# Patient Record
Sex: MALE | Race: WHITE | Employment: STUDENT | URBAN - METROPOLITAN AREA
[De-identification: names, ages, dates, MRNs, and addresses within clinical notes are randomized per-mention and may not be internally consistent; named-entity substitution may affect disease eponyms.]

---
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
## 2023-01-08 ENCOUNTER — HEALTH MAINTENANCE LETTER (OUTPATIENT)
Age: 18
End: 2023-01-08

## 2023-01-22 ENCOUNTER — E-VISIT (OUTPATIENT)
Dept: FAMILY MEDICINE | Facility: CLINIC | Age: 18
End: 2023-01-22
Payer: COMMERCIAL

## 2023-01-22 DIAGNOSIS — Z53.9 ERRONEOUS ENCOUNTER--DISREGARD: Primary | ICD-10-CM

## 2023-01-23 ENCOUNTER — DOCUMENTATION ONLY (OUTPATIENT)
Dept: BEHAVIORAL HEALTH | Facility: CLINIC | Age: 18
End: 2023-01-23
Payer: COMMERCIAL

## 2023-01-23 NOTE — PROGRESS NOTES
[facility] [employer] PHQ-9 Follow-up  Behavioral Health Clinician Triage Service    Stream PHQ-9 Responses:  [facility] Follow-up to PHQ 1/5/2017 1/22/2023   PHQ-9 9. Suicide Ideation past 2 weeks Not at all Several days   Thoughts of suicide or self harm in past 2 weeks - No   Thoughts of suicide or self harm in past 2 weeks - No   PHQ-9 Safety concerns? - No   PHQ-9 Safety concerns? - No        1st Outreach Date: January 23, 2023 Time: 10:32 AM  Outcome: No Answer.  [facility] Pool will make one more phone attempt within 24 hours.  [facility] called pt and was unable to leave a VM due to voicemail box not being set up yet. [facility] also did send a Stream  Message on this date as well. [facility] also sent a Stream message to PCP regarding pt and potential safety concerns. [facility] is waiting for a response.   SEAN Newton     2nd Outreach Date: January 23, 2023 Time: 9:50 AM  Outcome: No Answer.  [facility] was again unable to leave a VM due to mailbox not being set up yet.   SEAN Newton    [facility] was able to reach pt over the phone at 11:18 AM. Pt denies any current safety concerns including SI/HI/SIB. No hx of suicide attempts. Pt works with PCP regarding ADHD symptom management. Per chart review, pt had reached out to PCP about scheduling an eVist to discuss being referred to therapy or psychiatry. Per chart review, pt endorses feelings of self-loathing. [facility] asked pt if he would like help with a referral or if he would like [facility] to reach out to PCP in regards to scheduling a follow-up appointment. Pt states that he can reach out to his PCP through Stream. Pt states that at this time, he plans to establish with a therapist through his [employer]. Pt declined needing further assistance at this time.     Disposition:    - Recommendations / Safety Plan: A safety and risk management plan has not been developed at this time, however patient was encouraged to call [facility] should there be a change in any of these risk factors.

## 2023-01-24 NOTE — TELEPHONE ENCOUNTER
[facility] message sent.  Voicemail has not been set up yet.  Keira Coppola, [facility]

## 2023-04-29 ENCOUNTER — OFFICE VISIT (OUTPATIENT)
Dept: ORTHOPEDICS | Facility: CLINIC | Age: 18
End: 2023-04-29
Payer: COMMERCIAL

## 2023-04-29 VITALS
SYSTOLIC BLOOD PRESSURE: 136 MMHG | HEART RATE: 56 BPM | WEIGHT: 245 LBS | BODY MASS INDEX: 33 KG/M2 | DIASTOLIC BLOOD PRESSURE: 73 MMHG

## 2023-04-29 DIAGNOSIS — M25.462 KNEE EFFUSION, LEFT: ICD-10-CM

## 2023-04-29 DIAGNOSIS — S89.92XD KNEE INJURY, LEFT, SUBSEQUENT ENCOUNTER: ICD-10-CM

## 2023-04-29 DIAGNOSIS — M25.562 ACUTE PAIN OF LEFT KNEE: Primary | ICD-10-CM

## 2023-04-29 PROCEDURE — 99204 OFFICE O/P NEW MOD 45 MIN: CPT | Performed by: FAMILY MEDICINE

## 2023-04-29 NOTE — PROGRESS NOTES
Timothy Willis  :  2005  DOS: 2023  MRN: [id number]    Sports Medicine Clinic Visit    PCP: Adama Reardon PA-C    Timothy Willis is a 17 year old male who is seen as a WALK IN patient presenting with left knee injury    Injury: Patient describes injury as getting up out of a bean bag chair last Thursday (23) 9 day(s) ago. 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 States this left knee has always been bothering him. Pt went to [facility] on  for examination and to have XR's completed.  Pain located over lateral left knee, radiating to the ankle and hamstring at times.  Additional Features:  Positive: catching and locking.  Symptoms are better with Ice, Tylenol, Ibuprofen, Rest and Elevation.  Symptoms are worse with: extension, flexion, standing, stairs and walking.  Other evaluation and/or treatments so far consists of: Ice, Tylenol and Ibuprofen.  Recent imaging completed: X-rays completed 23 at [facility]- we do not have images.  Prior History of related problems: no previous injury/surgery to left knee.    Social History: student    Review of Systems  Musculoskeletal: as above  Remainder of review of systems is negative including constitutional, CV, pulmonary, GI, Skin and Neurologic except as noted in HPI or medical history.    Past Medical History:   Diagnosis Date     Anxiety      Asthma     mild persistent     GERD (gastroesophageal reflux disease)      Past Surgical History:   Procedure Laterality Date     ENT SURGERY  2010    tonsilectomy     ORTHOPEDIC SURGERY  spring 2010    reset broken arm bones     Family History   Problem Relation Age of Onset     Cardiovascular Brother         avm     Asthma Brother      Allergies Brother      Anesthesia Reaction Brother      Congenital Anomalies Brother      Connective Tissue Disorder Brother      Depression Brother      Neurologic Disorder Brother      Psychotic Disorder Brother         alana's      Respiratory Brother      Anxiety Disorder Brother      Asthma Mother      Cardiovascular Mother      Congenital Anomalies Mother      Connective Tissue Disorder Mother      Depression Mother      Obesity Mother      Psychotic Disorder Mother      Respiratory Mother      Anxiety Disorder Mother      Hypertension Father      Depression Father      Obesity Father      Psychotic Disorder Father      Thyroid Disease Father      Anxiety Disorder Father      Diabetes Father      Cardiovascular Maternal Grandmother      Respiratory Maternal Grandmother      Cerebrovascular Disease Maternal Grandmother      Obesity Maternal Grandmother      Cardiovascular Maternal Grandfather      Congenital Anomalies Maternal Grandfather      Heart Disease Maternal Grandfather      Cardiovascular Paternal Grandmother      Hypertension Paternal Grandmother      Thyroid Disease Paternal Grandfather        Objective  /73   Pulse 56   Wt 111.1 kg (245 lb)   BMI 33.00 kg/m        General: healthy, alert and in no distress      HEENT: no scleral icterus or conjunctival erythema     Skin: no suspicious lesions or rash. No jaundice.     CV: regular rhythm by palpation, 2+ distal pulses, no pedal edema      Resp: normal respiratory effort without conversational dyspnea     Psych: normal mood and affect      Gait: mildly antalgic, appropriate coordination and balance     Neuro: normal light touch sensory exam of the extremities. Motor strength as noted below     Left Knee exam    ROM:        Flexion lacks 20 degrees       Extension lacks 5 degrees       Limited by pain, stiffness    Inspection:       no visible ecchymosis        effusion noted small    Skin:       no visible deformities       well perfused       capillary refill brisk    Patellar Motion:        Normal patellar tracking noted through range of motion       Crepitus noted in the patellofemoral joint    Tender:        lateral patellar border       lateral joint line       distal  IT Band     Non Tender:         remainder of knee area        medial patellar border        medial joint line        along MCL        infrapatellar tendon        tibial tubercle    Special Tests:        positive (+) Tino       neg (-) Lachman       neg (-) anterior drawer       neg (-) posterior drawer       neg (-) varus at 0 deg and 30 deg for laxity, + for mild pain laterally       neg (-) valgus at 0 deg and 30 deg       moderate pain with forced extension/bounce test    Evaluation of ipsilateral kinetic chain       normal strength with hip extension and abduction       Baseline modest core stability       Left quad mild atrophy/deactivation      Radiology  Outside imaging reviewed, negative, recent XR from [facility]    Assessment:  1. Acute pain of left knee    2. Knee effusion, left    3. Knee injury, left, subsequent encounter        Plan:  Discussed the assessment with the patient.  Follow up: will contact with MRI results  Milder chronic pain in left knee acutely worsened after awkward twist while getting up from cross-legged position  Signs of effusion, mild instability sx, limited ROM  Will eval for internal derangement with MRI given age, sx, activity level, normal XR  XR images independently visualized and reviewed with patient today in clinic  RICE and bracing options reviewed  Modified activity strategies and low impact options reviewed  Quad atrophy present secondary to effusion/pain, reviewed basic HEP for re-strengthening, will be critically important long term  Oral Tylenol and topical Voltaren gel reviewed as safe OTC options, reviewed safe dosing strategies  We discussed modified progressive pain-free activity as tolerated  Home handouts provided and supportive care reviewed  All questions were answered today  Contact us with additional questions or concerns  Signs and sx of concern reviewed      Neto Bo DO, BALWINDER  Sports Medicine Physician  [facility]      Time spent in chart review, one-on-one evaluation, discussion with patient regarding: nature of problem, clinical course, prior treatments, therapeutic options, shared-decision making, potential procedures and referrals, and charting related to the visit: 32 minutes.  If applicable, time does not include time spent performing any procedure.                  Disclaimer: This note consists of symbols derived from keyboarding, dictation and/or voice recognition software. As a result, there may be errors in the script that have gone undetected. Please consider this when interpreting information found in this chart.

## 2023-04-29 NOTE — LETTER
2023         RE: Timothy Willis  [id number] [place] [address]        Dear Colleague,    Thank you for referring your patient, Timothy Willis, to the [facility]. Please see a copy of my visit note below.    Timothy Willis  :  2005  DOS: 2023  MRN: [id number]    Sports Medicine Clinic Visit    PCP: Adama Reardon PA-C    Timothy Willis is a 17 year old male who is seen as a WALK IN patient presenting with left knee injury    Injury: Patient describes injury as getting up out of a bean bag chair last Thursday (23) 9 day(s) ago. 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 States this left knee has always been bothering him. Pt went to [facility] on  for examination and to have XR's completed.  Pain located over lateral left knee, radiating to the ankle and hamstring at times.  Additional Features:  Positive: catching and locking.  Symptoms are better with Ice, Tylenol, Ibuprofen, Rest and Elevation.  Symptoms are worse with: extension, flexion, standing, stairs and walking.  Other evaluation and/or treatments so far consists of: Ice, Tylenol and Ibuprofen.  Recent imaging completed: X-rays completed 23 at [facility]- we do not have images.  Prior History of related problems: no previous injury/surgery to left knee.    Social History: student    Review of Systems  Musculoskeletal: as above  Remainder of review of systems is negative including constitutional, CV, pulmonary, GI, Skin and Neurologic except as noted in HPI or medical history.    Past Medical History:   Diagnosis Date     Anxiety      Asthma     mild persistent     GERD (gastroesophageal reflux disease)      Past Surgical History:   Procedure Laterality Date     ENT SURGERY  2010    tonsilectomy     ORTHOPEDIC SURGERY  spring 2010    reset broken arm bones     Family History   Problem Relation Age of Onset     Cardiovascular Brother         avm      Asthma Brother      Allergies Brother      Anesthesia Reaction Brother      Congenital Anomalies Brother      Connective Tissue Disorder Brother      Depression Brother      Neurologic Disorder Brother      Psychotic Disorder Brother         asberger's     Respiratory Brother      Anxiety Disorder Brother      Asthma Mother      Cardiovascular Mother      Congenital Anomalies Mother      Connective Tissue Disorder Mother      Depression Mother      Obesity Mother      Psychotic Disorder Mother      Respiratory Mother      Anxiety Disorder Mother      Hypertension Father      Depression Father      Obesity Father      Psychotic Disorder Father      Thyroid Disease Father      Anxiety Disorder Father      Diabetes Father      Cardiovascular Maternal Grandmother      Respiratory Maternal Grandmother      Cerebrovascular Disease Maternal Grandmother      Obesity Maternal Grandmother      Cardiovascular Maternal Grandfather      Congenital Anomalies Maternal Grandfather      Heart Disease Maternal Grandfather      Cardiovascular Paternal Grandmother      Hypertension Paternal Grandmother      Thyroid Disease Paternal Grandfather        Objective  /73   Pulse 56   Wt 111.1 kg (245 lb)   BMI 33.00 kg/m        General: healthy, alert and in no distress      HEENT: no scleral icterus or conjunctival erythema     Skin: no suspicious lesions or rash. No jaundice.     CV: regular rhythm by palpation, 2+ distal pulses, no pedal edema      Resp: normal respiratory effort without conversational dyspnea     Psych: normal mood and affect      Gait: mildly antalgic, appropriate coordination and balance     Neuro: normal light touch sensory exam of the extremities. Motor strength as noted below     Left Knee exam    ROM:        Flexion lacks 20 degrees       Extension lacks 5 degrees       Limited by pain, stiffness    Inspection:       no visible ecchymosis        effusion noted small    Skin:       no visible deformities        well perfused       capillary refill brisk    Patellar Motion:        Normal patellar tracking noted through range of motion       Crepitus noted in the patellofemoral joint    Tender:        lateral patellar border       lateral joint line       distal IT Band     Non Tender:         remainder of knee area        medial patellar border        medial joint line        along MCL        infrapatellar tendon        tibial tubercle    Special Tests:        positive (+) Tino       neg (-) Lachman       neg (-) anterior drawer       neg (-) posterior drawer       neg (-) varus at 0 deg and 30 deg for laxity, + for mild pain laterally       neg (-) valgus at 0 deg and 30 deg       moderate pain with forced extension/bounce test    Evaluation of ipsilateral kinetic chain       normal strength with hip extension and abduction       Baseline modest core stability       Left quad mild atrophy/deactivation      Radiology  Outside imaging reviewed, negative, recent XR from [facility]    Assessment:  1. Acute pain of left knee    2. Knee effusion, left    3. Knee injury, left, subsequent encounter        Plan:  Discussed the assessment with the patient.  Follow up: will contact with MRI results  Milder chronic pain in left knee acutely worsened after awkward twist while getting up from cross-legged position  Signs of effusion, mild instability sx, limited ROM  Will eval for internal derangement with MRI given age, sx, activity level, normal XR  XR images independently visualized and reviewed with patient today in clinic  RICE and bracing options reviewed  Modified activity strategies and low impact options reviewed  Quad atrophy present secondary to effusion/pain, reviewed basic HEP for re-strengthening, will be critically important long term  Oral Tylenol and topical Voltaren gel reviewed as safe OTC options, reviewed safe dosing strategies  We discussed modified progressive pain-free activity as tolerated  Home  handouts provided and supportive care reviewed  All questions were answered today  Contact us with additional questions or concerns  Signs and sx of concern reviewed      Neto Bo DO, BALWINDER  Sports Medicine Physician  [facility]      Time spent in chart review, one-on-one evaluation, discussion with patient regarding: nature of problem, clinical course, prior treatments, therapeutic options, shared-decision making, potential procedures and referrals, and charting related to the visit: 32 minutes.  If applicable, time does not include time spent performing any procedure.                  Disclaimer: This note consists of symbols derived from keyboarding, dictation and/or voice recognition software. As a result, there may be errors in the script that have gone undetected. Please consider this when interpreting information found in this chart.        Again, thank you for allowing me to participate in the care of your patient.        Sincerely,        Neto Bo DO

## 2023-05-17 ENCOUNTER — ANCILLARY PROCEDURE (OUTPATIENT)
Dept: MRI IMAGING | Facility: CLINIC | Age: 18
End: 2023-05-17
Attending: FAMILY MEDICINE
Payer: COMMERCIAL

## 2023-05-17 DIAGNOSIS — S89.92XD KNEE INJURY, LEFT, SUBSEQUENT ENCOUNTER: ICD-10-CM

## 2023-05-17 DIAGNOSIS — M25.562 ACUTE PAIN OF LEFT KNEE: ICD-10-CM

## 2023-05-17 DIAGNOSIS — M25.462 KNEE EFFUSION, LEFT: ICD-10-CM

## 2023-05-17 PROCEDURE — 73721 MRI JNT OF LWR EXTRE W/O DYE: CPT | Mod: TC | Performed by: RADIOLOGY

## 2023-05-20 ENCOUNTER — TELEPHONE (OUTPATIENT)
Dept: ORTHOPEDICS | Facility: CLINIC | Age: 18
End: 2023-05-20
Payer: COMMERCIAL

## 2023-05-20 DIAGNOSIS — S83.252D BUCKET-HANDLE TEAR OF LATERAL MENISCUS OF LEFT KNEE AS CURRENT INJURY, SUBSEQUENT ENCOUNTER: ICD-10-CM

## 2023-05-20 DIAGNOSIS — S89.92XD KNEE INJURY, LEFT, SUBSEQUENT ENCOUNTER: Primary | ICD-10-CM

## 2023-05-20 NOTE — TELEPHONE ENCOUNTER
"Reviewed results of MRI with Dr Bo.   Patient has lateral meniscus tear.  Given patient's activity level and complaints of \"catching\" during his exam, would recommend consultation with Ortho Surgery to discuss surgical options.  Recommend patient schedule with either Anthony Perez MD @ [facility] or RIA Alex MD @ [facility].    Spoke to patient discussed MRI results and recommendations.  Overall his knee pain and swelling are improving, however still experiencing catching sensation with some mild instability.  He would like to remain local.  Ortho  orders placed and scheduling information provided for [facility].    Yossi Oglesby ATC  "

## 2023-05-31 ENCOUNTER — TRANSFERRED RECORDS (OUTPATIENT)
Dept: HEALTH INFORMATION MANAGEMENT | Facility: CLINIC | Age: 18
End: 2023-05-31
Payer: COMMERCIAL

## 2023-06-08 ENCOUNTER — OFFICE VISIT (OUTPATIENT)
Dept: FAMILY MEDICINE | Facility: CLINIC | Age: 18
End: 2023-06-08
Payer: COMMERCIAL

## 2023-06-08 VITALS
RESPIRATION RATE: 14 BRPM | TEMPERATURE: 97.5 F | BODY MASS INDEX: 32.59 KG/M2 | OXYGEN SATURATION: 98 % | HEART RATE: 70 BPM | DIASTOLIC BLOOD PRESSURE: 68 MMHG | WEIGHT: 240.6 LBS | HEIGHT: 72 IN | SYSTOLIC BLOOD PRESSURE: 108 MMHG

## 2023-06-08 DIAGNOSIS — Z01.818 PREOP GENERAL PHYSICAL EXAM: Primary | ICD-10-CM

## 2023-06-08 DIAGNOSIS — S83.8X2D ACUTE MENISCAL INJURY OF LEFT KNEE, SUBSEQUENT ENCOUNTER: ICD-10-CM

## 2023-06-08 PROCEDURE — 99214 OFFICE O/P EST MOD 30 MIN: CPT | Performed by: PHYSICIAN ASSISTANT

## 2023-06-08 ASSESSMENT — PAIN SCALES - GENERAL: PAINLEVEL: MODERATE PAIN (4)

## 2023-06-08 NOTE — PATIENT INSTRUCTIONS
Manuel Aguirre,    Thank you for allowing [facility] to manage your care.    If you have any questions or concerns, please feel free to call us at [phone]    Sincerely,    Damian Camarillo PA-C    Did you know?      You can schedule a video visit for follow-up appointments as well as future appointments for certain conditions.  Please see the below link.     [email or web address]    If you have not already done so,  I encourage you to sign up for [employer] ([email or web address]).  This will allow you to review your results, securely communicate with a provider, and schedule virtual visits as well.    For informational purposes only. Not to replace the advice of your health care provider. Copyright   2003, 2019 [facility]. All rights reserved. Clinically reviewed by Cassie Moise MD. [employer] [id number] - REV 12/22.  Preparing for Your Surgery  Getting started  A nurse will call you to review your health history and instructions. They will give you an arrival time based on your scheduled surgery time. Please be ready to share:  Your doctor's clinic name and phone number  Your medical, surgical, and anesthesia history  A list of allergies and sensitivities  A list of medicines, including herbal treatments and over-the-counter drugs  Whether the patient has a legal guardian (ask how to send us the papers in advance)  Please tell us if you're pregnant--or if there's any chance you might be pregnant. Some surgeries may injure a fetus (unborn baby), so they require a pregnancy test. Surgeries that are safe for a fetus don't always need a test, and you can choose whether to have one.   If you have a child who's having surgery, please ask for a copy of Preparing for Your Child's Surgery.    Preparing for surgery  Within 10 to 30 days of surgery: Have a pre-op exam (sometimes called an H&P, or History and Physical). This can be done at a clinic or pre-operative  center.  If you're having a , you may not need this exam. Talk to your care team.  At your pre-op exam, talk to your care team about all medicines you take. If you need to stop any medicines before surgery, ask when to start taking them again.  We do this for your safety. Many medicines can make you bleed too much during surgery. Some change how well surgery (anesthesia) drugs work.  Call your insurance company to let them know you're having surgery. (If you don't have insurance, call [phone].)  Call your clinic if there's any change in your health. This includes signs of a cold or flu (sore throat, runny nose, cough, rash, fever). It also includes a scrape or scratch near the surgery site.  If you have questions on the day of surgery, call your hospital or surgery center.  Eating and drinking guidelines  For your safety: Unless your surgeon tells you otherwise, follow the guidelines below.  Eat and drink as usual until 8 hours before you arrive for surgery. After that, no food or milk.  Drink clear liquids until 2 hours before you arrive. These are liquids you can see through, like water, Gatorade, and Propel Water. They also include plain black coffee and tea (no cream or milk), candy, and breath mints. You can spit out gum when you arrive.  If you drink alcohol: Stop drinking it the night before surgery.  If your care team tells you to take medicine on the morning of surgery, it's okay to take it with a sip of water.  Preventing infection  Shower or bathe the night before and morning of your surgery. Follow the instructions your clinic gave you. (If no instructions, use regular soap.)  Don't shave or clip hair near your surgery site. We'll remove the hair if needed.  Don't smoke or vape the morning of surgery. You may chew nicotine gum up to 2 hours before surgery. A nicotine patch is okay.  Note: Some surgeries require you to completely quit smoking and nicotine. Check with your surgeon.  Your care  team will make every effort to keep you safe from infection. We will:  Clean our hands often with soap and water (or an alcohol-based hand rub).  Clean the skin at your surgery site with a special soap that kills germs.  Give you a special gown to keep you warm. (Cold raises the risk of infection.)  Wear special hair covers, masks, gowns and gloves during surgery.  Give antibiotic medicine, if prescribed. Not all surgeries need antibiotics.  What to bring on the day of surgery  Photo ID and insurance card  Copy of your health care directive, if you have one  Glasses and hearing aids (bring cases)  You can't wear contacts during surgery  Inhaler and eye drops, if you use them (tell us about these when you arrive)  CPAP machine or breathing device, if you use them  A few personal items, if spending the night  If you have . . .  A pacemaker, ICD (cardiac defibrillator) or other implant: Bring the ID card.  An implanted stimulator: Bring the remote control.  A legal guardian: Bring a copy of the certified (court-stamped) guardianship papers.  Please remove any jewelry, including body piercings. Leave jewelry and other valuables at home.  If you're going home the day of surgery  You must have a responsible adult drive you home. They should stay with you overnight as well.  If you don't have someone to stay with you, and you aren't safe to go home alone, we may keep you overnight. Insurance often won't pay for this.  After surgery  If it's hard to control your pain or you need more pain medicine, please call your surgeon's office.  Questions?   If you have any questions for your care team, list them here: _________________________________________________________________________________________________________________________________________________________________________ ____________________________________ ____________________________________ ____________________________________    How to Take Your Medication Before  Surgery  - HOLD (do not take) your Vyvanse the day of the surgery

## 2023-06-08 NOTE — PROGRESS NOTES
[facility]  [address] [facility] [address] [address]  [place] [address]  Phone: [phone]  Primary Provider: Adama Reardon  Pre-op Performing Provider: SREEDHAR DUQUE      PREOPERATIVE EVALUATION:  Today's date: 6/8/2023    Timothy Willis is a 18 year old male who presents for a preoperative evaluation.      6/8/2023     7:33 AM   Additional Questions   Roomed by alexander hill   Accompanied by none         6/8/2023     7:33 AM   Patient Reported Additional Medications   Patient reports taking the following new medications none     Surgical Information:  Surgery/Procedure: Left knee  [facility] Location: [facility]  Surgeon: Dr. NGHIA Wilkins  Surgery Date: 6/9/23  Time of Surgery: 8 am  Where patient plans to recover: At home with family  Fax number for surgical facility: [phone]    Assessment & Plan     The proposed surgical procedure is considered INTERMEDIATE risk.    Problem List Items Addressed This Visit    None  Visit Diagnoses     Preop general physical exam    -  Primary    Acute meniscal injury of left knee, subsequent encounter             - No identified additional risk factors other than previously addressed    Antiplatelet or Anticoagulation Medication Instructions:   - Patient is on no antiplatelet or anticoagulation medications.    Additional Medication Instructions:   - Psychostimulants: Hold the day of surgery    RECOMMENDATION:  APPROVAL GIVEN to proceed with proposed procedure, without further diagnostic evaluation.    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        6/8/2023     7:32 AM   Preop Questions   1. Have you ever had a heart attack or stroke? No   2. Have you ever had surgery on your heart or blood vessels, such as a stent placement, a coronary artery bypass, or surgery on an artery in your head, neck, heart, or legs? No   3. Do you have chest pain with  activity? No   4. Do you have a history of  heart failure? No   5. Do you currently have a cold, bronchitis or symptoms of other infection? No   6. Do you have a cough, shortness of breath, or wheezing? No   7. Do you or anyone in your family have previous history of blood clots? No   8. Do you or does anyone in your family have a serious bleeding problem such as prolonged bleeding following surgeries or cuts? No   9. Have you ever had problems with anemia or been told to take iron pills? No   10. Have you had any abnormal blood loss such as black, tarry or bloody stools? No   11. Have you ever had a blood transfusion? No   12. Are you willing to have a blood transfusion if it is medically needed before, during, or after your surgery? Yes   13. Have you or any of your relatives ever had problems with anesthesia? YES - brother has propofol infusion syndrome   14. Do you have sleep apnea, excessive snoring or daytime drowsiness? No   15. Do you have any artifical heart valves or other implanted medical devices like a pacemaker, defibrillator, or continuous glucose monitor? No   16. Do you have artificial joints? No   17. Are you allergic to latex? No       Health Care Directive:  Patient does not have a Health Care Directive or Living Will: Discussed advance care planning with patient; however, patient declined at this time.    Preoperative Review of :   reviewed - controlled substances reflected in medication list.    Status of Chronic Conditions:  See problem list for active medical problems.  Problems all longstanding and stable, except as noted/documented.  See ROS for pertinent symptoms related to these conditions.      Review of Systems  CONSTITUTIONAL: NEGATIVE for fever, chills, change in weight  INTEGUMENTARY/SKIN: NEGATIVE for worrisome rashes, moles or lesions  EYES: NEGATIVE for vision changes or irritation  ENT/MOUTH: NEGATIVE for ear, mouth and throat problems  RESP: NEGATIVE for significant cough  or SOB  CV: NEGATIVE for chest pain, palpitations or peripheral edema  GI: NEGATIVE for nausea, abdominal pain, heartburn, or change in bowel habits  : NEGATIVE for frequency, dysuria, or hematuria  MUSCULOSKELETAL: NEGATIVE for significant arthralgias or myalgia  NEURO: NEGATIVE for weakness, dizziness or paresthesias  ENDOCRINE: NEGATIVE for temperature intolerance, skin/hair changes  HEME: NEGATIVE for bleeding problems  PSYCHIATRIC: NEGATIVE for changes in mood or affect    Patient Active Problem List    Diagnosis Date Noted     Severe obesity (BMI >= 40) (H) 09/12/2017     Priority: Medium     Myopia of both eyes 09/21/2016     Priority: Medium     Chronic rhinitis 05/20/2013     Priority: Medium     Generalized anxiety disorder 05/21/2012     Priority: Medium     Diagnosis updated by automated process. Provider to review and confirm.       Intermittent asthma 11/21/2011     Priority: Medium     ADHD (attention deficit hyperactivity disorder) 10/29/2010     Priority: Medium     9/29/2015: family in office today.  Refills for ADHD meds given.  May call for 2 additional refills prior to another clinic visit      8/13/2018:  Off ADHD meds for past year. Significantly he passed but end of the year was pretty rough.  However, he no longer needed medication at night to help him go to sleep and he said he wasn't as tired as when on medication.    Family has appointment with Bob BRADLEY at [facility]  ( presently seeing Wilfredo, brother ) and family happy with treatment plan.  Appointment end of September.    No plans to have any medication started through this office at this time.       Learning disabilities 06/16/2010     Priority: Medium     Sensory disturbance 06/16/2010     Priority: Medium     Family history of hypercholesterolemia 08/13/2009     Priority: Medium      Past Medical History:   Diagnosis Date     Anxiety      Asthma     mild persistent     GERD (gastroesophageal reflux disease)       Past Surgical History:   Procedure Laterality Date     ENT SURGERY  december 2010    tonsilectomy     ORTHOPEDIC SURGERY  spring 2010    reset broken arm bones     Current Outpatient Medications   Medication Sig Dispense Refill     lisdexamfetamine (VYVANSE) 60 MG capsule Take 1 capsule (60 mg) by mouth every morning 30 capsule 0     albuterol (PROAIR HFA/PROVENTIL HFA/VENTOLIN HFA) 108 (90 Base) MCG/ACT inhaler Inhale 2 puffs into the lungs every 4 hours as needed for shortness of breath / dyspnea or wheezing (Patient not taking: Reported on 6/8/2023) 18 g 0     triamcinolone (KENALOG) 0.5 % external ointment Apply twice a day for 2 weeks to affected rash (Patient not taking: Reported on 6/8/2023) 15 g 1       Allergies   Allergen Reactions     Bee Venom Anaphylaxis     Propofol Anaphylaxis     PN: brother had a severe reaction, organs shut down.  Therefore, all siblings say they have an allergy to propofol to be safe  Sibling with anaphylaxis  Genetics suggested all sibling be considered allergic        Social History     Tobacco Use     Smoking status: Never     Smokeless tobacco: Never     Tobacco comments:     nonsmoking home   Vaping Use     Vaping status: Never Used   Substance Use Topics     Alcohol use: Yes     Family History   Problem Relation Age of Onset     Cardiovascular Brother         avm     Asthma Brother      Allergies Brother      Anesthesia Reaction Brother      Congenital Anomalies Brother      Connective Tissue Disorder Brother      Depression Brother      Neurologic Disorder Brother      Psychotic Disorder Brother         asberger's     Respiratory Brother      Anxiety Disorder Brother      Asthma Mother      Cardiovascular Mother      Congenital Anomalies Mother      Connective Tissue Disorder Mother      Depression Mother      Obesity Mother      Psychotic Disorder Mother      Respiratory Mother      Anxiety Disorder Mother      Hypertension Father      Depression Father      Obesity  "Father      Psychotic Disorder Father      Thyroid Disease Father      Anxiety Disorder Father      Diabetes Father      Cardiovascular Maternal Grandmother      Respiratory Maternal Grandmother      Cerebrovascular Disease Maternal Grandmother      Obesity Maternal Grandmother      Cardiovascular Maternal Grandfather      Congenital Anomalies Maternal Grandfather      Heart Disease Maternal Grandfather      Cardiovascular Paternal Grandmother      Hypertension Paternal Grandmother      Thyroid Disease Paternal Grandfather      History   Drug Use No         Objective     /68   Pulse 70   Temp 97.5  F (36.4  C) (Tympanic)   Resp 14   Ht 1.834 m (6' 0.21\")   Wt 109.1 kg (240 lb 9.6 oz)   SpO2 98%   BMI 32.45 kg/m      Physical Exam    GENERAL APPEARANCE: healthy, alert and no distress     EYES: EOMI,  PERRL     HENT: nose and mouth without ulcers or lesions     NECK: no adenopathy, no asymmetry, masses, or scars and thyroid normal to palpation     RESP: lungs clear to auscultation - no rales, rhonchi or wheezes     CV: regular rates and rhythm, normal S1 S2, no S3 or S4 and no murmur, click or rub     ABDOMEN:  soft, nontender, no HSM or masses and bowel sounds normal     MS: extremities normal- no gross deformities noted, no evidence of inflammation in joints, FROM in all extremities.     SKIN: no suspicious lesions or rashes     NEURO: Normal strength and tone, sensory exam grossly normal, mentation intact and speech normal     PSYCH: mentation appears normal. and affect normal/bright     LYMPHATICS: No cervical adenopathy    No results for input(s): HGB, PLT, INR, NA, POTASSIUM, CR, A1C in the last 77325 hours.     Diagnostics:  No labs were ordered during this visit.   No EKG required, no history of coronary heart disease, significant arrhythmia, peripheral arterial disease or other structural heart disease.    Revised Cardiac Risk Index (RCRI):  The patient has the following serious cardiovascular " risks for perioperative complications:   - No serious cardiac risks = 0 points     RCRI Interpretation: 0 points: Class I (very low risk - 0.4% complication rate)     Signed Electronically by: FABIENNE Myrick  Copy of this evaluation report is provided to requesting physician.

## 2023-06-21 ENCOUNTER — TRANSFERRED RECORDS (OUTPATIENT)
Dept: HEALTH INFORMATION MANAGEMENT | Facility: CLINIC | Age: 18
End: 2023-06-21
Payer: COMMERCIAL

## 2023-10-08 ENCOUNTER — TRANSFERRED RECORDS (OUTPATIENT)
Dept: HEALTH INFORMATION MANAGEMENT | Facility: CLINIC | Age: 18
End: 2023-10-08
Payer: COMMERCIAL

## 2024-02-11 ENCOUNTER — HEALTH MAINTENANCE LETTER (OUTPATIENT)
Age: 19
End: 2024-02-11

## 2024-02-19 NOTE — PATIENT INSTRUCTIONS
Before Your Child s Surgery or Sedated Procedure      Please call the doctor if there s any change in your child s health, including signs of a cold or flu (sore throat, runny nose, cough, rash or fever). If your child is having surgery, call the surgeon s office. If your child is having another procedure, call your family doctor.    Do not give over-the-counter medicine within 24 hours of the surgery or procedure (unless the doctor tells you to).    If your child takes prescribed drugs: Ask the doctor which medicines are safe to take before the surgery or procedure.    Follow the care team s instructions for eating and drinking before surgery or procedure.     Have your child take a shower or bath the night before surgery, cleaning their skin gently. Use the soap the surgeon gave you. If you were not given special soap, use your regular soap. Do not shave or scrub the surgery site.    Have your child wear clean pajamas and use clean sheets on their bed.    Before Your Child s Surgery or Sedated Procedure      Please call the doctor if there s any change in your child s health, including signs of a cold or flu (sore throat, runny nose, cough, rash or fever). If your child is having surgery, call the surgeon s office. If your child is having another procedure, call your family doctor.    Do not give over-the-counter medicine within 24 hours of the surgery or procedure (unless the doctor tells you to).    If your child takes prescribed drugs: Ask the doctor which medicines are safe to take before the surgery or procedure.  Don't take the metadate or ritalin the day of surgery but take everything else as usual    Follow the care team s instructions for eating and drinking before surgery or procedure.  Do not take aspirin, fish oil, gingko, ginseng or vitamin E for 1wk before surgery.  Do not take ibuprofen, advil, motrin, naprosyn, aleve for 5d before surgery.    
19-Feb-2024 13:41

## 2024-10-02 ASSESSMENT — PATIENT HEALTH QUESTIONNAIRE - PHQ9
SUM OF ALL RESPONSES TO PHQ QUESTIONS 1-9: 23
SUM OF ALL RESPONSES TO PHQ QUESTIONS 1-9: 23
10. IF YOU CHECKED OFF ANY PROBLEMS, HOW DIFFICULT HAVE THESE PROBLEMS MADE IT FOR YOU TO DO YOUR WORK, TAKE CARE OF THINGS AT HOME, OR GET ALONG WITH OTHER PEOPLE: VERY DIFFICULT

## 2024-10-02 ASSESSMENT — ASTHMA QUESTIONNAIRES
QUESTION_1 LAST FOUR WEEKS HOW MUCH OF THE TIME DID YOUR ASTHMA KEEP YOU FROM GETTING AS MUCH DONE AT WORK, SCHOOL OR AT HOME: NONE OF THE TIME
QUESTION_3 LAST FOUR WEEKS HOW OFTEN DID YOUR ASTHMA SYMPTOMS (WHEEZING, COUGHING, SHORTNESS OF BREATH, CHEST TIGHTNESS OR PAIN) WAKE YOU UP AT NIGHT OR EARLIER THAN USUAL IN THE MORNING: NOT AT ALL
QUESTION_5 LAST FOUR WEEKS HOW WOULD YOU RATE YOUR ASTHMA CONTROL: COMPLETELY CONTROLLED
QUESTION_2 LAST FOUR WEEKS HOW OFTEN HAVE YOU HAD SHORTNESS OF BREATH: NOT AT ALL
ACT_TOTALSCORE: 25
QUESTION_4 LAST FOUR WEEKS HOW OFTEN HAVE YOU USED YOUR RESCUE INHALER OR NEBULIZER MEDICATION (SUCH AS ALBUTEROL): NOT AT ALL
ACT_TOTALSCORE: 25

## 2024-10-03 ENCOUNTER — OFFICE VISIT (OUTPATIENT)
Dept: FAMILY MEDICINE | Facility: CLINIC | Age: 19
End: 2024-10-03
Payer: COMMERCIAL

## 2024-10-03 ENCOUNTER — TELEPHONE (OUTPATIENT)
Dept: FAMILY MEDICINE | Facility: CLINIC | Age: 19
End: 2024-10-03

## 2024-10-03 VITALS
BODY MASS INDEX: 34.67 KG/M2 | SYSTOLIC BLOOD PRESSURE: 136 MMHG | RESPIRATION RATE: 20 BRPM | DIASTOLIC BLOOD PRESSURE: 70 MMHG | OXYGEN SATURATION: 100 % | TEMPERATURE: 98.1 F | HEIGHT: 72 IN | WEIGHT: 256 LBS | HEART RATE: 56 BPM

## 2024-10-03 DIAGNOSIS — F32.1 CURRENT MODERATE EPISODE OF MAJOR DEPRESSIVE DISORDER WITHOUT PRIOR EPISODE (H): Primary | ICD-10-CM

## 2024-10-03 DIAGNOSIS — F90.2 ATTENTION DEFICIT HYPERACTIVITY DISORDER (ADHD), COMBINED TYPE: ICD-10-CM

## 2024-10-03 DIAGNOSIS — F41.9 ANXIETY: ICD-10-CM

## 2024-10-03 DIAGNOSIS — F51.01 PRIMARY INSOMNIA: ICD-10-CM

## 2024-10-03 LAB — TSH SERPL DL<=0.005 MIU/L-ACNC: 1.74 UIU/ML (ref 0.3–4.2)

## 2024-10-03 PROCEDURE — 84443 ASSAY THYROID STIM HORMONE: CPT | Performed by: PHYSICIAN ASSISTANT

## 2024-10-03 PROCEDURE — 36415 COLL VENOUS BLD VENIPUNCTURE: CPT | Performed by: PHYSICIAN ASSISTANT

## 2024-10-03 PROCEDURE — 99214 OFFICE O/P EST MOD 30 MIN: CPT | Performed by: PHYSICIAN ASSISTANT

## 2024-10-03 RX ORDER — DEXTROAMPHETAMINE SACCHARATE, AMPHETAMINE ASPARTATE MONOHYDRATE, DEXTROAMPHETAMINE SULFATE AND AMPHETAMINE SULFATE 3.75; 3.75; 3.75; 3.75 MG/1; MG/1; MG/1; MG/1
15 CAPSULE, EXTENDED RELEASE ORAL DAILY
Qty: 30 CAPSULE | Refills: 0 | Status: SHIPPED | OUTPATIENT
Start: 2024-10-03 | End: 2024-10-04

## 2024-10-03 RX ORDER — ESCITALOPRAM OXALATE 10 MG/1
TABLET ORAL
Qty: 60 TABLET | Refills: 0 | Status: SHIPPED | OUTPATIENT
Start: 2024-10-03

## 2024-10-03 RX ORDER — MIRTAZAPINE 15 MG/1
15 TABLET, FILM COATED ORAL AT BEDTIME
Qty: 60 TABLET | Refills: 0 | Status: SHIPPED | OUTPATIENT
Start: 2024-10-03

## 2024-10-03 RX ORDER — HYDROXYZINE PAMOATE 25 MG/1
25 CAPSULE ORAL 3 TIMES DAILY PRN
Qty: 60 CAPSULE | Refills: 1 | Status: SHIPPED | OUTPATIENT
Start: 2024-10-03

## 2024-10-03 NOTE — TELEPHONE ENCOUNTER
PRIOR AUTHORIZATION DENIED    Medication: AMPHETAMINE-DEXTROAMPHET ER 15 MG PO CP24  Insurance Company: [employer] - Phone [phone] Fax [phone]  Denial Date: 10/3/2024  Denial Reason(s):       Appeal Information:     Patient Notified: No

## 2024-10-03 NOTE — PROGRESS NOTES
"    Subjective   Timothy is a 19 year old, presenting for the following health issues:  Mental Health Problem (Depression and Anxiety) and Health Maintenance (Patient declined Tdap, Pneumonia, Influenza, COVID and HPV vaccines)        10/3/2024     8:09 AM   Additional Questions   Roomed by Keira Coppola CMA   Accompanied by None         10/3/2024     8:09 AM   Patient Reported Additional Medications   Patient reports taking the following new medications none     History of Present Illness       Reason for visit:  Depression and Anxiety      Noting anhedonia.  Depression.   Adhd a modest issue currently. Doing ok in school.  Some anxiety and insomnia.   Some stressors.   No weight changes. No chest pain.   No manic episodes.   Review of Systems  Constitutional, HEENT, cardiovascular, pulmonary, GI, , musculoskeletal, neuro, skin, endocrine and psych systems are negative, except as otherwise noted.      Objective    /70   Pulse 56   Temp 98.1  F (36.7  C) (Oral)   Resp 20   Ht 1.835 m (6' 0.24\")   Wt 116.1 kg (256 lb)   SpO2 100%   BMI 34.49 kg/m    Body mass index is 34.49 kg/m .  Physical Exam     Eye exam - right eye normal lid, conjunctiva, cornea, pupil and fundus, left eye normal lid, conjunctiva, cornea, pupil and fundus.  Thyroid not palpable, not enlarged, no nodules detected.  CHEST:chest clear to IPPA, no tachypnea, retractions or cyanosis, and S1, S2 normal, no murmur, no gallop, rate regular.    Timothy was seen today for mental health problem and health maintenance.    Diagnoses and all orders for this visit:    Current moderate episode of major depressive disorder without prior episode (H)  -     escitalopram (LEXAPRO) 10 MG tablet; Take 1/2 tab daily for 1 week, then increase to one full tab daily thereafter  -     mirtazapine (REMERON) 15 MG tablet; Take 1 tablet (15 mg) by mouth at bedtime.    Primary insomnia  -     mirtazapine (REMERON) 15 MG tablet; Take 1 tablet (15 mg) by mouth at " bedtime.    Anxiety  -     hydrOXYzine negro (VISTARIL) 25 MG capsule; Take 1 capsule (25 mg) by mouth 3 times daily as needed for anxiety.  -     escitalopram (LEXAPRO) 10 MG tablet; Take 1/2 tab daily for 1 week, then increase to one full tab daily thereafter  -     TSH with free T4 reflex; Future    Attention deficit hyperactivity disorder (ADHD), combined type  -     amphetamine-dextroamphetamine (ADDERALL XR) 15 MG 24 hr capsule; Take 1 capsule (15 mg) by mouth daily.    Other orders  -     REVIEW OF HEALTH MAINTENANCE PROTOCOL ORDERS      Will consider counseling. Doesn't feel he can afford it currently.  work on lifestyle modification  Recheck in 6 wks.   Signed Electronically by: Adama Reardon PA-C

## 2024-10-11 NOTE — TELEPHONE ENCOUNTER
Find out if [facility] filled this? If not, let him know his insurance doesn't want to cover the adderall xr. They want him taking an IR form of adderall twice a day. If he's interested, I'll route an rx for this to his pharmacy .

## 2024-10-11 NOTE — TELEPHONE ENCOUNTER
Attempted to call patient with number on file to relay provider's message below with no answer, left voicemail to call clinic back at [phone].    Tonie Enriquez RN on 10/11/2024 at 9:06 AM

## 2024-10-15 NOTE — TELEPHONE ENCOUNTER
Called [phone] (home)    Did they answer the phone: No, left a message on voicemail to return call to the [facility] at [phone], and to ask for any available triage nurse.  (RN did not leave specific details on voicemail for confidential reasons)    Cornelia DIAS RN  Triage Nurse  [facility]

## 2024-10-16 NOTE — TELEPHONE ENCOUNTER
Attempt #3     RN left message to return call to clinic [phone].  (RN did not leave specific details on voicemail for confidential reasons)    Attempt #4   QFPayt message sent      Margi Nunez RN on 10/16/2024 at 11:57 AM

## 2025-01-22 ENCOUNTER — VIRTUAL VISIT (OUTPATIENT)
Dept: FAMILY MEDICINE | Facility: CLINIC | Age: 20
End: 2025-01-22
Payer: COMMERCIAL

## 2025-01-22 DIAGNOSIS — F90.2 ATTENTION DEFICIT HYPERACTIVITY DISORDER (ADHD), COMBINED TYPE: Primary | ICD-10-CM

## 2025-01-22 PROCEDURE — 98005 SYNCH AUDIO-VIDEO EST LOW 20: CPT | Performed by: PHYSICIAN ASSISTANT

## 2025-01-22 ASSESSMENT — PATIENT HEALTH QUESTIONNAIRE - PHQ9
SUM OF ALL RESPONSES TO PHQ QUESTIONS 1-9: 9
10. IF YOU CHECKED OFF ANY PROBLEMS, HOW DIFFICULT HAVE THESE PROBLEMS MADE IT FOR YOU TO DO YOUR WORK, TAKE CARE OF THINGS AT HOME, OR GET ALONG WITH OTHER PEOPLE: SOMEWHAT DIFFICULT
SUM OF ALL RESPONSES TO PHQ QUESTIONS 1-9: 9

## 2025-01-22 NOTE — PROGRESS NOTES
Timothy is a 19 year old who is being evaluated via a billable video visit.    How would you like to obtain your AVS? Leandro  If the video visit is dropped, the invitation should be resent by: Text to cell phone: [phone]  Will anyone else be joining your video visit? No        Subjective   Timothy is a 19 year old, presenting for the following health issues:  Recheck Medication      1/22/2025     1:14 PM   Additional Questions   Roomed by Leandro   Accompanied by None     History of Present Illness       Reason for visit:  Med check He is missing 2 dose(s) of medications per week.  He is not taking prescribed medications regularly due to remembering to take.       Recheck of his adhd. Taking the the adderall and tolerating it quite well. Works well to manage his target symptoms of adhd. More motivation. Feels in control of his life again. Never took the lexapro as his mood and anxiety improved with better management of his adhd.     Review of Systems  Constitutional, HEENT, cardiovascular, pulmonary, GI, , musculoskeletal, neuro, skin, endocrine and psych systems are negative, except as otherwise noted.      Objective           Vitals:  No vitals were obtained today due to virtual visit.    Physical Exam   GENERAL: alert and no distress  EYES: Eyes grossly normal to inspection.  No discharge or erythema, or obvious scleral/conjunctival abnormalities.  RESP: No audible wheeze, cough, or visible cyanosis.    SKIN: Visible skin clear. No significant rash, abnormal pigmentation or lesions.  NEURO: Cranial nerves grossly intact.  Mentation and speech appropriate for age.  PSYCH: Appropriate affect, tone, and pace of words    Timothy was seen today for recheck medication.    Diagnoses and all orders for this visit:    Attention deficit hyperactivity disorder (ADHD), combined type      Continue adderall.  work on lifestyle modification  Recheck in 6 mos       Video-Visit Details    Type of service:  Video Visit    Originating Location (pt. Location): Home    Distant Location (provider location):  On-site  Platform used for Video Visit: Gabino  Signed Electronically by: Adama Reardon PA-C

## 2025-03-08 ENCOUNTER — HEALTH MAINTENANCE LETTER (OUTPATIENT)
Age: 20
End: 2025-03-08